# Patient Record
Sex: FEMALE | URBAN - METROPOLITAN AREA
[De-identification: names, ages, dates, MRNs, and addresses within clinical notes are randomized per-mention and may not be internally consistent; named-entity substitution may affect disease eponyms.]

---

## 2024-02-09 ENCOUNTER — TELNP (OUTPATIENT)
Dept: URBAN - METROPOLITAN AREA TELEHEALTH 2 | Facility: TELEHEALTH | Age: 29
End: 2024-02-09
Payer: COMMERCIAL

## 2024-02-09 DIAGNOSIS — R10.84 ABDOMINAL PAIN, GENERALIZED: ICD-10-CM

## 2024-02-09 DIAGNOSIS — K62.5 RECTAL BLEEDING: ICD-10-CM

## 2024-02-09 DIAGNOSIS — R19.7 CHRONIC DIARRHEA: ICD-10-CM

## 2024-02-09 PROCEDURE — 99244 OFF/OP CNSLTJ NEW/EST MOD 40: CPT | Performed by: PHYSICIAN ASSISTANT

## 2024-02-09 PROCEDURE — 99204 OFFICE O/P NEW MOD 45 MIN: CPT | Performed by: PHYSICIAN ASSISTANT

## 2024-02-09 RX ORDER — NORETHINDRONE ACETATE 5 MG/1
TABLET ORAL
Qty: 30 TABLET | Status: ACTIVE | COMMUNITY

## 2024-02-09 NOTE — HPI-TODAY'S VISIT:
This patient was referred by Dr. Martha Dominique  for an evaluation of abd pain and rectal bleeding.  A copy of this will be sent to the referring provider. She notes she had a baby a year ago and has been having issues with hemm since that time, worse X months. Notes itching, pain and intermittent hematochezia which she states was daily in the summer of July 2023 and then started to become intermittent, occucuring for a week straight, stop for days and restart for a week. Blood is BRB and on the TP and in the stools, at times sign amounts. She has BMs every day, sometimes 2/day. Notes diarrhea intermittently since August, 1-2/week, loose and watery. She has had rare nocturnal stools. She has not lost weight unintentionally. She notes abd pain that is throughout her lower abdomen, sharp in nature, intermittent, worse when having diarrhea.  No FH of IBD or colon CA.  Had labs with PCP in sept--states all normal.

## 2024-02-12 ENCOUNTER — OV CON (OUTPATIENT)
Dept: URBAN - METROPOLITAN AREA CLINIC 124 | Facility: CLINIC | Age: 29
End: 2024-02-12

## 2024-02-13 LAB
A/G RATIO: 2
ABSOLUTE BASOPHILS: 30
ABSOLUTE EOSINOPHILS: 90
ABSOLUTE LYMPHOCYTES: 1782
ABSOLUTE MONOCYTES: 450
ABSOLUTE NEUTROPHILS: 3648
ALBUMIN: 4.5
ALKALINE PHOSPHATASE: 64
ALT (SGPT): 13
AST (SGOT): 20
BASOPHILS: 0.5
BILIRUBIN, TOTAL: 0.5
BUN/CREATININE RATIO: 9
BUN: 6
C-REACTIVE PROTEIN, QUANT: 0.3
CALCIUM: 9.1
CARBON DIOXIDE, TOTAL: 25
CHLORIDE: 105
CREATININE: 0.66
EGFR: 122
EOSINOPHILS: 1.5
GLOBULIN, TOTAL: 2.2
GLUCOSE: 79
HEMATOCRIT: 38.1
HEMOGLOBIN: 12.6
IMMUNOGLOBULIN A: 108
INTERPRETATION: (no result)
LYMPHOCYTES: 29.7
MCH: 31
MCHC: 33.1
MCV: 93.6
MONOCYTES: 7.5
MPV: 10.6
NEUTROPHILS: 60.8
PLATELET COUNT: 340
POTASSIUM: 4.9
PROTEIN, TOTAL: 6.7
RDW: 12.5
RED BLOOD CELL COUNT: 4.07
SODIUM: 139
TISSUE TRANSGLUTAMINASE AB, IGA: <1
WHITE BLOOD CELL COUNT: 6

## 2024-02-20 LAB — CALPROTECTIN, FECAL: 14

## 2024-03-07 ENCOUNTER — LAB (OUTPATIENT)
Dept: URBAN - METROPOLITAN AREA CLINIC 4 | Facility: CLINIC | Age: 29
End: 2024-03-07
Payer: COMMERCIAL

## 2024-03-07 ENCOUNTER — COLON (OUTPATIENT)
Dept: URBAN - METROPOLITAN AREA SURGERY CENTER 16 | Facility: SURGERY CENTER | Age: 29
End: 2024-03-07
Payer: COMMERCIAL

## 2024-03-07 DIAGNOSIS — K63.89 OTHER SPECIFIED DISEASES OF INTESTINE: ICD-10-CM

## 2024-03-07 DIAGNOSIS — R19.7 ACUTE DIARRHEA: ICD-10-CM

## 2024-03-07 DIAGNOSIS — K92.1 ACUTE MELENA: ICD-10-CM

## 2024-03-07 PROCEDURE — 88305 TISSUE EXAM BY PATHOLOGIST: CPT | Performed by: PATHOLOGY

## 2024-03-07 PROCEDURE — 45380 COLONOSCOPY AND BIOPSY: CPT | Performed by: INTERNAL MEDICINE

## 2024-03-07 RX ORDER — NORETHINDRONE ACETATE 5 MG/1
TABLET ORAL
Qty: 30 TABLET | Status: ACTIVE | COMMUNITY

## 2024-04-15 ENCOUNTER — TELEP (OUTPATIENT)
Dept: URBAN - METROPOLITAN AREA TELEHEALTH 2 | Facility: TELEHEALTH | Age: 29
End: 2024-04-15
Payer: COMMERCIAL

## 2024-04-15 DIAGNOSIS — K62.5 RECTAL BLEEDING: ICD-10-CM

## 2024-04-15 DIAGNOSIS — K64.8 INTERNAL HEMORRHOIDS: ICD-10-CM

## 2024-04-15 DIAGNOSIS — R19.7 CHRONIC DIARRHEA: ICD-10-CM

## 2024-04-15 DIAGNOSIS — R10.84 ABDOMINAL PAIN, GENERALIZED: ICD-10-CM

## 2024-04-15 PROCEDURE — 99213 OFFICE O/P EST LOW 20 MIN: CPT | Performed by: PHYSICIAN ASSISTANT

## 2024-04-15 RX ORDER — NORETHINDRONE ACETATE 5 MG/1
TABLET ORAL
Qty: 30 TABLET | Status: ACTIVE | COMMUNITY

## 2024-04-15 NOTE — HPI-TODAY'S VISIT:
28yoF seen in Feb for eval of abd pain and rectal bleeding.  She noted she had a baby a year ago and has been having issues with hemm since that time, worse X months. Notes itching, pain and intermittent hematochezia which she states was daily in the summer of July 2023 and then started to become intermittent, occucuring for a week straight, stop for days and restart for a week. Blood is BRB and on the TP and in the stools, at times sign amounts. She has BMs every day, sometimes 2/day. Notes diarrhea intermittently since August, 1-2/week, loose and watery. She has had rare nocturnal stools. She has not lost weight unintentionally. She noted abd pain that is throughout her lower abdomen, sharp in nature, intermittent, worse when having diarrhea.  Labs/stool as below No FH of IBD or colon CA. Colon 3/2024 IH, small benign polyp ow normal, neg random bx She went on a plant based diet since her last OV and BMs daily, formed, no further diarrhea, bleeding or pain.